# Patient Record
Sex: FEMALE | Race: WHITE | NOT HISPANIC OR LATINO
[De-identification: names, ages, dates, MRNs, and addresses within clinical notes are randomized per-mention and may not be internally consistent; named-entity substitution may affect disease eponyms.]

---

## 2017-01-19 ENCOUNTER — TRANSCRIPTION ENCOUNTER (OUTPATIENT)
Age: 37
End: 2017-01-19

## 2017-06-16 ENCOUNTER — TRANSCRIPTION ENCOUNTER (OUTPATIENT)
Age: 37
End: 2017-06-16

## 2018-02-05 ENCOUNTER — TRANSCRIPTION ENCOUNTER (OUTPATIENT)
Age: 38
End: 2018-02-05

## 2018-05-22 ENCOUNTER — EMERGENCY (EMERGENCY)
Facility: HOSPITAL | Age: 38
LOS: 0 days | Discharge: HOME | End: 2018-05-22
Attending: EMERGENCY MEDICINE | Admitting: EMERGENCY MEDICINE
Payer: COMMERCIAL

## 2018-05-22 VITALS
DIASTOLIC BLOOD PRESSURE: 60 MMHG | SYSTOLIC BLOOD PRESSURE: 106 MMHG | RESPIRATION RATE: 17 BRPM | HEART RATE: 78 BPM | OXYGEN SATURATION: 100 % | TEMPERATURE: 98 F

## 2018-05-22 VITALS — HEIGHT: 66 IN | WEIGHT: 162.04 LBS

## 2018-05-22 DIAGNOSIS — M54.32 SCIATICA, LEFT SIDE: ICD-10-CM

## 2018-05-22 DIAGNOSIS — F17.210 NICOTINE DEPENDENCE, CIGARETTES, UNCOMPLICATED: ICD-10-CM

## 2018-05-22 DIAGNOSIS — R20.0 ANESTHESIA OF SKIN: ICD-10-CM

## 2018-05-22 RX ORDER — HYDROMORPHONE HYDROCHLORIDE 2 MG/ML
1 INJECTION INTRAMUSCULAR; INTRAVENOUS; SUBCUTANEOUS ONCE
Refills: 0 | Status: DISCONTINUED | OUTPATIENT
Start: 2018-05-22 | End: 2018-05-22

## 2018-05-22 RX ORDER — MORPHINE SULFATE 50 MG/1
4 CAPSULE, EXTENDED RELEASE ORAL ONCE
Refills: 0 | Status: DISCONTINUED | OUTPATIENT
Start: 2018-05-22 | End: 2018-05-22

## 2018-05-22 RX ORDER — ONDANSETRON 8 MG/1
1 TABLET, FILM COATED ORAL
Qty: 12 | Refills: 0
Start: 2018-05-22

## 2018-05-22 RX ORDER — HYDROMORPHONE HYDROCHLORIDE 2 MG/ML
0.5 INJECTION INTRAMUSCULAR; INTRAVENOUS; SUBCUTANEOUS ONCE
Refills: 0 | Status: DISCONTINUED | OUTPATIENT
Start: 2018-05-22 | End: 2018-05-22

## 2018-05-22 RX ORDER — SODIUM CHLORIDE 9 MG/ML
1000 INJECTION INTRAMUSCULAR; INTRAVENOUS; SUBCUTANEOUS ONCE
Refills: 0 | Status: COMPLETED | OUTPATIENT
Start: 2018-05-22 | End: 2018-05-22

## 2018-05-22 RX ORDER — DEXAMETHASONE 0.5 MG/5ML
6 ELIXIR ORAL ONCE
Refills: 0 | Status: COMPLETED | OUTPATIENT
Start: 2018-05-22 | End: 2018-05-22

## 2018-05-22 RX ORDER — ONDANSETRON 8 MG/1
4 TABLET, FILM COATED ORAL ONCE
Refills: 0 | Status: COMPLETED | OUTPATIENT
Start: 2018-05-22 | End: 2018-05-22

## 2018-05-22 RX ORDER — OXYCODONE HYDROCHLORIDE 5 MG/1
1 TABLET ORAL
Qty: 20 | Refills: 0
Start: 2018-05-22

## 2018-05-22 RX ADMIN — MORPHINE SULFATE 4 MILLIGRAM(S): 50 CAPSULE, EXTENDED RELEASE ORAL at 20:56

## 2018-05-22 RX ADMIN — HYDROMORPHONE HYDROCHLORIDE 0.5 MILLIGRAM(S): 2 INJECTION INTRAMUSCULAR; INTRAVENOUS; SUBCUTANEOUS at 18:20

## 2018-05-22 RX ADMIN — SODIUM CHLORIDE 1000 MILLILITER(S): 9 INJECTION INTRAMUSCULAR; INTRAVENOUS; SUBCUTANEOUS at 15:34

## 2018-05-22 RX ADMIN — ONDANSETRON 4 MILLIGRAM(S): 8 TABLET, FILM COATED ORAL at 15:35

## 2018-05-22 RX ADMIN — HYDROMORPHONE HYDROCHLORIDE 0.5 MILLIGRAM(S): 2 INJECTION INTRAMUSCULAR; INTRAVENOUS; SUBCUTANEOUS at 17:50

## 2018-05-22 RX ADMIN — Medication 6 MILLIGRAM(S): at 15:35

## 2018-05-22 RX ADMIN — HYDROMORPHONE HYDROCHLORIDE 1 MILLIGRAM(S): 2 INJECTION INTRAMUSCULAR; INTRAVENOUS; SUBCUTANEOUS at 15:35

## 2018-05-22 RX ADMIN — HYDROMORPHONE HYDROCHLORIDE 1 MILLIGRAM(S): 2 INJECTION INTRAMUSCULAR; INTRAVENOUS; SUBCUTANEOUS at 17:00

## 2018-05-22 NOTE — ED STATDOCS - OBJECTIVE STATEMENT
37 y/o female with a PMHx of herniated lumbar discs presents to the ED c/o severe left sided back pain with radiation to left leg. +numbness and tingling of left leg, bottom of left foot and left buttock. +weakness. Took motrin PTA this AM. Denies urinary incontinence. Herniated L4 L5 surgery 3 years ago. Former smoker.

## 2018-05-22 NOTE — ED STATDOCS - PROGRESS NOTE DETAILS
signed Damaris Ochoa PA-C Pt seen initially in intake by Dr Farias.   Pt c/o atraumatic low back pain x 3 weeks, became worse last night. Pt c/o new numbness in LLE and buttocks. Hx L4 L5 disc sx several years ago with Dr Castillo. Pt saw PA in Dr Castillo's office yesterday, given rx for prednisone and gabapentin, though pt notes gabapentin rx was never received by pharmacy. Pt with weakness of ankle and great toe dorsi/plantarflexion 5-/5. Amble to ambulate unassisted though not on her left toes. mild numbness LLE. Pt refuses rectal exam. Plan analgesia and MRI. Pt agrees with plan of  care. Spine paged signed Damaris Ochoa PA-C   Spoke to spine Dr Collins. Agrees with plan for MRI signed Damaris Ochoa PA-C Pt seen initially in intake by Dr Farias.   Pt c/o atraumatic low back pain x 3 weeks, became worse last night. Pt c/o new numbness in LLE and buttocks. Hx L4 L5 disc sx several years ago with Dr Castillo. Pt saw PA in Dr Castillo's office yesterday, given rx for prednisone and gabapentin, though pt notes gabapentin rx was never received by pharmacy. Pt with weakness of ankle and great toe dorsi/plantarflexion 5-/5. Amble to ambulate unassisted though not on her left toes. mild numbness LLE. Pt refuses rectal exam. Plan analgesia and MRI. Pt agrees with plan of  care. Spoke to MRI tech, MRI won't be until at least 2 hours or more. Spine paged. signed Damaris Ochoa PA-C   Spoke to spine Dr Collins. Agrees with plan for MRI. If any significant findings, page on call spine again. If no significant findings may DC for close outpt f/u. signed Damaris Ochoa PA-C   MRI shows significant left sided disc extrusion L4 L5. Pt still having pain. Spine paged. signed Damaris Ochoa PA-C   Spoke to Dr Bacon on call for spine regarding pts exam and MRI results. Pt may DC home, close outpt f/u this week, likely needs epidural vs surgery. rx for analgesia. Pt agrees with DC and plan of care, still in pain but able to ambulate. rx oxycodone and zofran. Will give pt copy of MRI. return precautions discussed in detail with pt and .

## 2018-05-22 NOTE — ED STATDOCS - MEDICAL DECISION MAKING DETAILS
37 y/o female presents with left sided back pain, plan for MRI r/o cord compression. Patient refused rectal exam.

## 2020-03-20 PROBLEM — Z00.00 ENCOUNTER FOR PREVENTIVE HEALTH EXAMINATION: Noted: 2020-03-20

## 2020-05-06 ENCOUNTER — TRANSCRIPTION ENCOUNTER (OUTPATIENT)
Age: 40
End: 2020-05-06

## 2020-05-27 ENCOUNTER — APPOINTMENT (OUTPATIENT)
Dept: OBGYN | Facility: CLINIC | Age: 40
End: 2020-05-27
Payer: COMMERCIAL

## 2020-05-27 ENCOUNTER — TRANSCRIPTION ENCOUNTER (OUTPATIENT)
Age: 40
End: 2020-05-27

## 2020-05-27 VITALS
HEIGHT: 65 IN | WEIGHT: 177 LBS | BODY MASS INDEX: 29.49 KG/M2 | SYSTOLIC BLOOD PRESSURE: 110 MMHG | DIASTOLIC BLOOD PRESSURE: 70 MMHG | TEMPERATURE: 98.3 F

## 2020-05-27 DIAGNOSIS — Z01.419 ENCOUNTER FOR GYNECOLOGICAL EXAMINATION (GENERAL) (ROUTINE) W/OUT ABNORMAL FINDINGS: ICD-10-CM

## 2020-05-27 NOTE — CHIEF COMPLAINT
[Initial Visit] : initial GYN visit [FreeTextEntry1] : Patient is a 40-year-old female presents for routine annual gynecologic examination. Patient has no complaints. Patient has never had a mammogram.

## 2020-06-03 LAB
C TRACH RRNA SPEC QL NAA+PROBE: NOT DETECTED
HPV HIGH+LOW RISK DNA PNL CVX: NOT DETECTED
N GONORRHOEA RRNA SPEC QL NAA+PROBE: NOT DETECTED
SOURCE TP AMPLIFICATION: NORMAL

## 2020-08-26 ENCOUNTER — APPOINTMENT (OUTPATIENT)
Dept: MAMMOGRAPHY | Facility: CLINIC | Age: 40
End: 2020-08-26

## 2021-03-09 ENCOUNTER — EMERGENCY (EMERGENCY)
Facility: HOSPITAL | Age: 41
LOS: 0 days | Discharge: HOME | End: 2021-03-09
Attending: EMERGENCY MEDICINE
Payer: COMMERCIAL

## 2021-03-09 VITALS
SYSTOLIC BLOOD PRESSURE: 130 MMHG | OXYGEN SATURATION: 98 % | RESPIRATION RATE: 18 BRPM | DIASTOLIC BLOOD PRESSURE: 75 MMHG | TEMPERATURE: 99 F | HEART RATE: 70 BPM

## 2021-03-09 VITALS — WEIGHT: 177.91 LBS | HEIGHT: 65 IN

## 2021-03-09 DIAGNOSIS — K08.89 OTHER SPECIFIED DISORDERS OF TEETH AND SUPPORTING STRUCTURES: ICD-10-CM

## 2021-03-09 DIAGNOSIS — R50.9 FEVER, UNSPECIFIED: ICD-10-CM

## 2021-03-09 DIAGNOSIS — K04.7 PERIAPICAL ABSCESS WITHOUT SINUS: ICD-10-CM

## 2021-03-09 DIAGNOSIS — R51.9 HEADACHE, UNSPECIFIED: ICD-10-CM

## 2021-03-09 DIAGNOSIS — Z88.0 ALLERGY STATUS TO PENICILLIN: ICD-10-CM

## 2021-03-09 RX ORDER — HYDROMORPHONE HYDROCHLORIDE 2 MG/ML
1 INJECTION INTRAMUSCULAR; INTRAVENOUS; SUBCUTANEOUS ONCE
Refills: 0 | Status: DISCONTINUED | OUTPATIENT
Start: 2021-03-09 | End: 2021-03-09

## 2021-03-09 RX ADMIN — Medication 100 MILLIGRAM(S): at 13:28

## 2021-03-09 RX ADMIN — HYDROMORPHONE HYDROCHLORIDE 1 MILLIGRAM(S): 2 INJECTION INTRAMUSCULAR; INTRAVENOUS; SUBCUTANEOUS at 13:28

## 2021-03-09 NOTE — ED STATDOCS - PROGRESS NOTE DETAILS
signed Damaris Ochoa PA-C Pt seen initially in intake by Dr. Rolle   41F c/o worsening dental pain. She saw her dentist yesterday and was told she has a dental infection and needs a root canal but the infection needs to be treated first. Started on motrin and a z pack. Pt alert, appears uncomfortable, no facial swelling or apparent abscess. Plan 1 dose IV clinda and change z pack to PO clinda, rx for percocet. Pt agrees with plan of  care. signed Damaris Ochoa PA-C   Pt feeling much better, getting ride home. f/u with dentist. DC z pack. Pt agrees with DC and plan of care.

## 2021-03-09 NOTE — ED ADULT NURSE NOTE - CHIEF COMPLAINT QUOTE
c/o left sided toothache for past week, went to dentist yesterday and was told she had abscess, started taking zpack with motrin, with no pain relief, c/o fever last night, c/o pain radiating to left sided sinus pain and ear pain

## 2021-03-09 NOTE — ED STATDOCS - ATTENDING CONTRIBUTION TO CARE
I, Rasta Rolle MD,  performed the initial face to face bedside interview with this patient regarding history of present illness, review of symptoms and relevant past medical, social and family history.  I completed an independent physical examination.  I was the initial provider who evaluated this patient. I have signed out the follow up of any pending tests (i.e. labs, radiological studies) to the ACP.  I have communicated the patient’s plan of care and disposition with the ACP.  The history, relevant review of systems, past medical and surgical history, medical decision making, and physical examination was documented by the scribe in my presence and I attest to the accuracy of the documentation.

## 2021-03-09 NOTE — ED ADULT TRIAGE NOTE - CHIEF COMPLAINT QUOTE
Dear Sofi,   Your test results are all back -   -FIT test (screening test for colon cancer) was normal. ADVISE: rechecking this test in 1 year.  Let us know if you have any questions.  -Noemy Olivera, DO   c/o left sided toothache for past week, went to dentist yesterday and was told she had abscess, started taking zpack with motrin, with no pain relief, c/o fever last night, c/o pain radiating to left sided sinus pain and ear pain

## 2021-03-09 NOTE — ED STATDOCS - ENMT, MLM
Nasal mucosa clear. gingival disease lower gum underneath L lower incisor deeper wound, no active drainage, mild redness, braces behind lower incisor Throat has no vesicles, no oropharyngeal exudates and uvula is midline. Nasal mucosa clear. gingival disease lower gum,  underneath L lower incisor deeper wound, no active drainage, mild redness, braces behind lower incisor Throat has no vesicles, no oropharyngeal exudates and uvula is midline.

## 2021-03-09 NOTE — ED STATDOCS - CLINICAL SUMMARY MEDICAL DECISION MAKING FREE TEXT BOX
pt with dental infection, placed on Motrin Azithromycin but not helping, plan; erythromycin and pain control

## 2021-03-09 NOTE — ED ADULT NURSE NOTE - OBJECTIVE STATEMENT
pt. c/o tooth infection diagnosed by dentist yesterday and placed on z-pack, pt. states she has been taking motrin for pain without relief, pain has now spread to temples and has 10/10 headache.

## 2021-03-09 NOTE — ED STATDOCS - DISPOSITION TYPE
Spoke with Teresa from [facility] she was requesting signature and documentation on patient wound to support the need of her having the wound vac, I notified her that pt is being followed by infectious disease at [facility] but [facility] does go to patient home. I gave Teresa the information to reach out to the specialist  that is following patient and advised that I would still have Dr. Yaniv Mccain sign the forms for pt wound vac but they should contact pt specialist for supporting documentation for the wound vac.    DISCHARGE

## 2021-03-09 NOTE — ED STATDOCS - PATIENT PORTAL LINK FT
You can access the FollowMyHealth Patient Portal offered by [facility] by registering at the following website: [email or web address]. By joining [employer]’s FollowMyHealth portal, you will also be able to view your health information using other applications (apps) compatible with our system.

## 2021-03-10 ENCOUNTER — EMERGENCY (EMERGENCY)
Facility: HOSPITAL | Age: 41
LOS: 0 days | Discharge: HOME | End: 2021-03-10
Attending: EMERGENCY MEDICINE
Payer: COMMERCIAL

## 2021-03-10 VITALS
HEART RATE: 70 BPM | SYSTOLIC BLOOD PRESSURE: 125 MMHG | TEMPERATURE: 99 F | DIASTOLIC BLOOD PRESSURE: 64 MMHG | OXYGEN SATURATION: 99 % | RESPIRATION RATE: 17 BRPM

## 2021-03-10 VITALS — HEIGHT: 65 IN | WEIGHT: 177.03 LBS

## 2021-03-10 DIAGNOSIS — Z88.0 ALLERGY STATUS TO PENICILLIN: ICD-10-CM

## 2021-03-10 DIAGNOSIS — Z79.891 LONG TERM (CURRENT) USE OF OPIATE ANALGESIC: ICD-10-CM

## 2021-03-10 DIAGNOSIS — J45.909 UNSPECIFIED ASTHMA, UNCOMPLICATED: ICD-10-CM

## 2021-03-10 DIAGNOSIS — K02.9 DENTAL CARIES, UNSPECIFIED: ICD-10-CM

## 2021-03-10 DIAGNOSIS — K08.89 OTHER SPECIFIED DISORDERS OF TEETH AND SUPPORTING STRUCTURES: ICD-10-CM

## 2021-03-10 LAB
ALBUMIN SERPL ELPH-MCNC: 3.5 G/DL — SIGNIFICANT CHANGE UP (ref 3.3–5)
ALP SERPL-CCNC: 41 U/L — SIGNIFICANT CHANGE UP (ref 40–120)
ALT FLD-CCNC: 17 U/L — SIGNIFICANT CHANGE UP (ref 12–78)
ANION GAP SERPL CALC-SCNC: 4 MMOL/L — LOW (ref 5–17)
AST SERPL-CCNC: 7 U/L — LOW (ref 15–37)
BASOPHILS # BLD AUTO: 0.05 K/UL — SIGNIFICANT CHANGE UP (ref 0–0.2)
BASOPHILS NFR BLD AUTO: 0.4 % — SIGNIFICANT CHANGE UP (ref 0–2)
BILIRUB SERPL-MCNC: 1 MG/DL — SIGNIFICANT CHANGE UP (ref 0.2–1.2)
BUN SERPL-MCNC: 9 MG/DL — SIGNIFICANT CHANGE UP (ref 7–23)
CALCIUM SERPL-MCNC: 8.6 MG/DL — SIGNIFICANT CHANGE UP (ref 8.5–10.1)
CHLORIDE SERPL-SCNC: 106 MMOL/L — SIGNIFICANT CHANGE UP (ref 96–108)
CO2 SERPL-SCNC: 27 MMOL/L — SIGNIFICANT CHANGE UP (ref 22–31)
CREAT SERPL-MCNC: 0.6 MG/DL — SIGNIFICANT CHANGE UP (ref 0.5–1.3)
EOSINOPHIL # BLD AUTO: 0.15 K/UL — SIGNIFICANT CHANGE UP (ref 0–0.5)
EOSINOPHIL NFR BLD AUTO: 1.2 % — SIGNIFICANT CHANGE UP (ref 0–6)
GLUCOSE SERPL-MCNC: 91 MG/DL — SIGNIFICANT CHANGE UP (ref 70–99)
HCG SERPL-ACNC: <1 MIU/ML — SIGNIFICANT CHANGE UP
HCT VFR BLD CALC: 38.9 % — SIGNIFICANT CHANGE UP (ref 34.5–45)
HGB BLD-MCNC: 13 G/DL — SIGNIFICANT CHANGE UP (ref 11.5–15.5)
IMM GRANULOCYTES NFR BLD AUTO: 0.4 % — SIGNIFICANT CHANGE UP (ref 0–1.5)
LYMPHOCYTES # BLD AUTO: 1.59 K/UL — SIGNIFICANT CHANGE UP (ref 1–3.3)
LYMPHOCYTES # BLD AUTO: 12.4 % — LOW (ref 13–44)
MCHC RBC-ENTMCNC: 31.9 PG — SIGNIFICANT CHANGE UP (ref 27–34)
MCHC RBC-ENTMCNC: 33.4 GM/DL — SIGNIFICANT CHANGE UP (ref 32–36)
MCV RBC AUTO: 95.3 FL — SIGNIFICANT CHANGE UP (ref 80–100)
MONOCYTES # BLD AUTO: 1.38 K/UL — HIGH (ref 0–0.9)
MONOCYTES NFR BLD AUTO: 10.8 % — SIGNIFICANT CHANGE UP (ref 2–14)
NEUTROPHILS # BLD AUTO: 9.61 K/UL — HIGH (ref 1.8–7.4)
NEUTROPHILS NFR BLD AUTO: 74.8 % — SIGNIFICANT CHANGE UP (ref 43–77)
PLATELET # BLD AUTO: 273 K/UL — SIGNIFICANT CHANGE UP (ref 150–400)
POTASSIUM SERPL-MCNC: 4.2 MMOL/L — SIGNIFICANT CHANGE UP (ref 3.5–5.3)
POTASSIUM SERPL-SCNC: 4.2 MMOL/L — SIGNIFICANT CHANGE UP (ref 3.5–5.3)
PROT SERPL-MCNC: 7 GM/DL — SIGNIFICANT CHANGE UP (ref 6–8.3)
RBC # BLD: 4.08 M/UL — SIGNIFICANT CHANGE UP (ref 3.8–5.2)
RBC # FLD: 12.6 % — SIGNIFICANT CHANGE UP (ref 10.3–14.5)
SODIUM SERPL-SCNC: 137 MMOL/L — SIGNIFICANT CHANGE UP (ref 135–145)
WBC # BLD: 12.83 K/UL — HIGH (ref 3.8–10.5)
WBC # FLD AUTO: 12.83 K/UL — HIGH (ref 3.8–10.5)

## 2021-03-10 RX ORDER — KETOROLAC TROMETHAMINE 30 MG/ML
30 SYRINGE (ML) INJECTION ONCE
Refills: 0 | Status: DISCONTINUED | OUTPATIENT
Start: 2021-03-10 | End: 2021-03-10

## 2021-03-10 RX ORDER — CEFTRIAXONE 500 MG/1
1000 INJECTION, POWDER, FOR SOLUTION INTRAMUSCULAR; INTRAVENOUS ONCE
Refills: 0 | Status: COMPLETED | OUTPATIENT
Start: 2021-03-10 | End: 2021-03-10

## 2021-03-10 RX ORDER — CEFTRIAXONE 500 MG/1
1000 INJECTION, POWDER, FOR SOLUTION INTRAMUSCULAR; INTRAVENOUS ONCE
Refills: 0 | Status: DISCONTINUED | OUTPATIENT
Start: 2021-03-10 | End: 2021-03-10

## 2021-03-10 RX ORDER — SODIUM CHLORIDE 9 MG/ML
1000 INJECTION INTRAMUSCULAR; INTRAVENOUS; SUBCUTANEOUS ONCE
Refills: 0 | Status: COMPLETED | OUTPATIENT
Start: 2021-03-10 | End: 2021-03-10

## 2021-03-10 RX ADMIN — SODIUM CHLORIDE 1000 MILLILITER(S): 9 INJECTION INTRAMUSCULAR; INTRAVENOUS; SUBCUTANEOUS at 06:25

## 2021-03-10 RX ADMIN — Medication 30 MILLIGRAM(S): at 06:29

## 2021-03-10 RX ADMIN — CEFTRIAXONE 1000 MILLIGRAM(S): 500 INJECTION, POWDER, FOR SOLUTION INTRAMUSCULAR; INTRAVENOUS at 08:58

## 2021-03-10 NOTE — ED ADULT TRIAGE NOTE - CHIEF COMPLAINT QUOTE
patient complains of worsening swelling and pain from tooth infection. was seen in ED on 3/9 for same, discharged home on clindamycin. took two doses of clindamycin.

## 2021-03-10 NOTE — ED ADULT NURSE NOTE - OBJECTIVE STATEMENT
pt. presents to ED complaining of toothache. pt. was seen in  ED 3/9 and was sent home on PO abx. pt. is Since then swelling and pain got worse, pt. seen dentist and was given Zpack for infection. pt. has fever of 100.4 F at home.

## 2021-03-10 NOTE — ED PROVIDER NOTE - OBJECTIVE STATEMENT
40 y/o female in ED c/o left toothache x 2 days with swelling this AM.   pt states seen in ED yesterday for toothache and started on abx.   states was told by her dentist that infection needs to be treated first before any dental procedure.   pt states also with fever of 100.5 this morning.   pt denies any HA, cp, sob, n/v/d/abd pain.   tolerating PO.   no sick contacts or recent travel.

## 2021-03-10 NOTE — ED PROVIDER NOTE - PATIENT PORTAL LINK FT
You can access the FollowMyHealth Patient Portal offered by [facility] by registering at the following website: [email or web address]. By joining [employer]’s FollowMyHealth portal, you will also be able to view your health information using other applications (apps) compatible with our system.

## 2021-03-10 NOTE — ED PROVIDER NOTE - NSFOLLOWUPINSTRUCTIONS_ED_ALL_ED_FT
Please see Dr. Morales today at the oral maxillofacial surgery clinic for drainage of your abscess.  The phone number is [phone].  They will be expecting your call for an appointment today.      Dental Abscess    WHAT YOU NEED TO KNOW:    A dental abscess is a collection of pus in or around a tooth. A dental abscess is caused by bacteria. The bacteria can enter the tooth when the enamel (outer part of the tooth) is damaged by tooth decay. Bacteria can also enter the tooth through a chip in the tooth or a cut in the gum. Food particles that are stuck between the teeth for a long time may also lead to an abscess.     Dental Abscess         DISCHARGE INSTRUCTIONS:    Return to the emergency department if:   •You have severe pain in your tooth or jaw.      •You have trouble breathing because of pain or swelling.      Call your doctor if:   •Your symptoms get worse, even after treatment.      •Your mouth is bleeding.      •You cannot eat or drink because of pain or swelling.      •Your abscess returns.      •You have an injury that causes a crack in your tooth.      •You have questions or concerns about your condition or care.      Medicines: You may need any of the following:   •Antibiotics help treat a bacterial infection.       •NSAIDs, such as ibuprofen, help decrease swelling, pain, and fever. This medicine is available with or without a doctor's order. NSAIDs can cause stomach bleeding or kidney problems in certain people. If you take blood thinner medicine, always ask your healthcare provider if NSAIDs are safe for you. Always read the medicine label and follow directions.      •Acetaminophen decreases pain and fever. It is available without a doctor's order. Ask how much to take and how often to take it. Follow directions. Read the labels of all other medicines you are using to see if they also contain acetaminophen, or ask your doctor or pharmacist. Acetaminophen can cause liver damage if not taken correctly. Do not use more than 4 grams (4,000 milligrams) total of acetaminophen in one day.       •Prescription pain medicine may be given. Ask your healthcare provider how to take this medicine safely. Some prescription pain medicines contain acetaminophen. Do not take other medicines that contain acetaminophen without talking to your healthcare provider. Too much acetaminophen may cause liver damage. Prescription pain medicine may cause constipation. Ask your healthcare provider how to prevent or treat constipation.       •Take your medicine as directed. Contact your healthcare provider if you think your medicine is not helping or if you have side effects. Tell him of her if you are allergic to any medicine. Keep a list of the medicines, vitamins, and herbs you take. Include the amounts, and when and why you take them. Bring the list or the pill bottles to follow-up visits. Carry your medicine list with you in case of an emergency.      Self-care:   •Rinse your mouth every 2 hours with salt water. This will help keep the area clean.       •Gently brush your teeth twice a day with a soft tooth brush. This will help keep the area clean.       •Eat soft foods as directed. Soft foods may cause less pain. Examples include applesauce, yogurt, and cooked pasta. Ask your healthcare provider how long to follow this instruction.       •Apply a warm compress to your tooth or gum. Use a cotton ball or gauze soaked in warm water. Remove the compress in 10 minutes or when it becomes cool. Repeat 3 times a day.       Prevent another abscess:   •Brush your teeth at least 2 times a day with fluoride toothpaste.      •Use dental floss at least once a day to clean between your teeth.      •Rinse your mouth with water or mouthwash after meals and snacks. Chew sugarless gum.      •Avoid sugary and starchy food that can stick between your teeth. Limit drinks high in sugar, such as soda or fruit juice.      •See your dentist every 6 months for dental cleanings and oral exams.      Follow up with your doctor or dentist in 24 hours, or as directed: Your healthcare provider will need to check your teeth and gums. Write down your questions so you remember to ask them during your visits.         Cellulitis    WHAT YOU NEED TO KNOW:    Cellulitis is a skin infection caused by bacteria. Cellulitis may go away on its own or you may need treatment. Your healthcare provider may draw a [place] around the outside edges of your cellulitis. If your cellulitis spreads, your healthcare provider will see it outside of the [place]. Cellulitis          DISCHARGE INSTRUCTIONS:    Call 911 if:     You have sudden trouble breathing or chest pain.        Return to the emergency department if:     Your wound gets larger and more painful.       You feel a crackling under your skin when you touch it.      You have purple dots or bumps on your skin, or you see bleeding under your skin.      You have new swelling and pain in your legs.      The red, warm, swollen area gets larger.      You see red streaks coming from the infected area.    Contact your healthcare provider if:     You have a fever.      Your fever or pain does not go away or gets worse.      The area does not get smaller after 2 days of antibiotics.      Your skin is flaking or peeling off.      You have questions or concerns about your condition or care.    Medicines:     Antibiotics help treat the bacterial infection.       NSAIDs, such as ibuprofen, help decrease swelling, pain, and fever. NSAIDs can cause stomach bleeding or kidney problems in certain people. If you take blood thinner medicine, always ask if NSAIDs are safe for you. Always read the medicine label and follow directions. Do not give these medicines to children under 6 months of age without direction from your child's healthcare provider.      Acetaminophen decreases pain and fever. It is available without a doctor's order. Ask how much to take and how often to take it. Follow directions. Read the labels of all other medicines you are using to see if they also contain acetaminophen, or ask your doctor or pharmacist. Acetaminophen can cause liver damage if not taken correctly. Do not use more than 4 grams (4,000 milligrams) total of acetaminophen in one day.       Take your medicine as directed. Contact your healthcare provider if you think your medicine is not helping or if you have side effects. Tell him or her if you are allergic to any medicine. Keep a list of the medicines, vitamins, and herbs you take. Include the amounts, and when and why you take them. Bring the list or the pill bottles to follow-up visits. Carry your medicine list with you in case of an emergency.    Self-care:     Elevate the area above the level of your heart as often as you can. This will help decrease swelling and pain. Prop the area on pillows or blankets to keep it elevated comfortably.       Clean the area daily until the wound scabs over. Gently wash the area with soap and water. Pat dry. Use dressings as directed.       Place cool or warm, wet cloths on the area as directed. Use clean cloths and clean water. Leave it on the area until the cloth is room temperature. Pat the area dry with a clean, dry cloth. The cloths may help decrease pain.     Prevent cellulitis:     Do not scratch bug bites or areas of injury. You increase your risk for cellulitis by scratching these areas.       Do not share personal items, such as towels, clothing, and razors.       Clean exercise equipment with germ-killing  before and after you use it.      Wash your hands often. Use soap and water. Wash your hands after you use the bathroom, change a child's diapers, or sneeze. Wash your hands before you prepare or eat food. Use lotion to prevent dry, cracked skin. Handwashing           Wear pressure stockings as directed. You may be told to wear the stockings if you have peripheral edema. The stockings improve blood flow and decrease swelling.      Treat athlete’s foot. This can help prevent the spread of a bacterial skin infection.    Follow up with your healthcare provider within 3 days, or as directed: Your healthcare provider will check if your cellulitis is getting better. You may need different medicine. Write down your questions so you remember to ask them during your visits.

## 2021-03-10 NOTE — ED PROVIDER NOTE - CARE PROVIDER_API CALL
Luiz Morales (DMD; MD)  OralMaxillofacial Surgery  [address] [place] [address]  Phone: [phone]  Fax: [phone]  Follow Up Time: Urgent

## 2021-03-10 NOTE — ED ADULT NURSE NOTE - NSIMPLEMENTINTERV_GEN_ALL_ED
Implemented All Universal Safety Interventions:  [place] to call system. Call bell, personal items and telephone within reach. Instruct patient to call for assistance. Room bathroom lighting operational. Non-slip footwear when patient is off stretcher. Physically safe environment: no spills, clutter or unnecessary equipment. Stretcher in lowest position, wheels locked, appropriate side rails in place.

## 2021-03-10 NOTE — ED PROVIDER NOTE - PROGRESS NOTE DETAILS
Pt s/o to me.  Care assumed from Dr. Guerrero.  Pending CT max/face.  CT reveals cellulitis with subcentimeter abscess.  Discussed with Dr. Morales of [facility] who recommends f/u in clinic today for drainage.  Will squeeze patient  into schedule.  Pt well appearing on my assessment.  Dosed with ceftriaxone  here.  To go for intervention at this time.  AVSS and otherwise well appearing.  Would like to leave and f/u with [facility].  Understands indications to return.  D/c home with strict return precautions and prompt outpatient f/u.  Toribio Lara M.D.

## 2022-01-23 NOTE — ED ADULT TRIAGE NOTE - BMI (KG/M2)
ALERT AND ORIENTED, SPOUSE AT BEDSIDE BEGINNING OF SHIFT, SCHEDULED PAIN MEDS
GIVEN FOR GENERALIZED PAIN, ANXIOUS AT TIMES ESPECIALLY WITH REPOSITIONING OR
TOUCH, ASSESSMENTS AS CHARTED, WILL CONTINUE TO MONITOR PER POC 26.2

## 2022-11-23 NOTE — ED ADULT TRIAGE NOTE - HEIGHT IN INCHES
5 Libtayo Counseling- I discussed with the patient the risks of Libtayo including but not limited to nausea, vomiting, diarrhea, and bone or muscle pain.  The patient verbalized understanding of the proper use and possible adverse effects of Libtayo.  All of the patient's questions and concerns were addressed.

## 2023-01-17 PROBLEM — M51.26 OTHER INTERVERTEBRAL DISC DISPLACEMENT, LUMBAR REGION: Chronic | Status: ACTIVE | Noted: 2018-05-22

## 2023-02-06 NOTE — ED PROVIDER NOTE - RESPIRATORY, MLM
Patient is schedule for stress test on 2/14/23.Please advise if patient should hold off her metropolol medication . Thanks.   Breath sounds clear and equal bilaterally.

## 2023-03-14 ENCOUNTER — APPOINTMENT (OUTPATIENT)
Dept: OBGYN | Facility: CLINIC | Age: 43
End: 2023-03-14
Payer: COMMERCIAL

## 2023-03-14 VITALS
BODY MASS INDEX: 28.32 KG/M2 | HEART RATE: 75 BPM | DIASTOLIC BLOOD PRESSURE: 79 MMHG | WEIGHT: 170 LBS | SYSTOLIC BLOOD PRESSURE: 120 MMHG | HEIGHT: 65 IN

## 2023-03-14 DIAGNOSIS — Z00.00 ENCOUNTER FOR GENERAL ADULT MEDICAL EXAMINATION W/OUT ABNORMAL FINDINGS: ICD-10-CM

## 2023-03-14 LAB
BILIRUB UR QL STRIP: NORMAL
CLARITY UR: CLEAR
COLLECTION METHOD: NORMAL
GLUCOSE UR-MCNC: NORMAL
HCG UR QL: 0.2 EU/DL
HEMOGLOBIN: 13.5
HGB UR QL STRIP.AUTO: NORMAL
KETONES UR-MCNC: NORMAL
LEUKOCYTE ESTERASE UR QL STRIP: NORMAL
NITRITE UR QL STRIP: NORMAL
PH UR STRIP: 7
PROT UR STRIP-MCNC: NORMAL
SP GR UR STRIP: 1.01

## 2023-03-14 NOTE — DISCUSSION/SUMMARY
[FreeTextEntry1] : Pt to track periods over the next 3 months. \par Patient to follow up in 1 year for annual GYN exam\par Mammogram  due: now; Rx given\par Colonoscopy due: 45\par Bone density due: 55 or PM\par Pap ordered\par Hemoccult ordered\par All questions answered, patient is agreeable with plan.\par \par

## 2023-03-14 NOTE — PHYSICAL EXAM
[Chaperone Present] : A chaperone was present in the examining room during all aspects of the physical examination [Appropriately responsive] : appropriately responsive [Alert] : alert [No Acute Distress] : no acute distress [No Lymphadenopathy] : no lymphadenopathy [Soft] : soft [Non-tender] : non-tender [Non-distended] : non-distended [No HSM] : No HSM [No Lesions] : no lesions [No Mass] : no mass [Oriented x3] : oriented x3 [Examination Of The Breasts] : a normal appearance [No Masses] : no breast masses were palpable [Labia Minora] : normal [Labia Majora] : normal [Normal] : normal [Uterine Adnexae] : normal [FreeTextEntry1] : SOPHIA GustafsonC

## 2023-03-14 NOTE — PHYSICAL EXAM
[Chaperone Present] : A chaperone was present in the examining room during all aspects of the physical examination [Appropriately responsive] : appropriately responsive [Alert] : alert [No Acute Distress] : no acute distress [No Lymphadenopathy] : no lymphadenopathy [Soft] : soft [Non-tender] : non-tender [Non-distended] : non-distended [No HSM] : No HSM [No Lesions] : no lesions [No Mass] : no mass [Oriented x3] : oriented x3 [Examination Of The Breasts] : a normal appearance [No Masses] : no breast masses were palpable [Labia Majora] : normal [Labia Minora] : normal [Normal] : normal [Uterine Adnexae] : normal [FreeTextEntry1] : SOPHIA GustafsonC

## 2023-03-14 NOTE — HISTORY OF PRESENT ILLNESS
[FreeTextEntry1] : 43 year old female presents as a new patient to establish care. condoms for BC. c/o changes in her period. pt states she was previously very regular and has had irregular periods for the past 2 months. \par \par hx- 2 c-sections, family hx of breast ca (aunt at age 40 & paternal grandmother), BRCA negative, diskectomy, neck fusion

## 2023-03-15 ENCOUNTER — APPOINTMENT (OUTPATIENT)
Dept: MAMMOGRAPHY | Facility: CLINIC | Age: 43
End: 2023-03-15
Payer: COMMERCIAL

## 2023-03-15 ENCOUNTER — RESULT REVIEW (OUTPATIENT)
Age: 43
End: 2023-03-15

## 2023-03-15 ENCOUNTER — OUTPATIENT (OUTPATIENT)
Dept: OUTPATIENT SERVICES | Facility: HOSPITAL | Age: 43
LOS: 1 days | End: 2023-03-15
Payer: COMMERCIAL

## 2023-03-15 DIAGNOSIS — Z00.00 ENCOUNTER FOR GENERAL ADULT MEDICAL EXAMINATION WITHOUT ABNORMAL FINDINGS: ICD-10-CM

## 2023-03-16 ENCOUNTER — NON-APPOINTMENT (OUTPATIENT)
Age: 43
End: 2023-03-16

## 2023-03-16 DIAGNOSIS — R92.8 OTHER ABNORMAL AND INCONCLUSIVE FINDINGS ON DIAGNOSTIC IMAGING OF BREAST: ICD-10-CM

## 2023-03-20 LAB — CYTOLOGY CVX/VAG DOC THIN PREP: NORMAL

## 2023-04-03 ENCOUNTER — NON-APPOINTMENT (OUTPATIENT)
Age: 43
End: 2023-04-03

## 2024-08-05 NOTE — DISCUSSION/SUMMARY
HISTORY OF PRESENT ILLNESS:   The patient is a 13 year old female who is seen today along with mother with complaint of about 1 month history of pressure and fullness sensation in her right ear.  Denies any drainage or significant pain.  Denies upper respiratory symptoms.  They did try cleaning the ears out that this did not seem to make any difference.  No other new concerns or complaints    MEDICATIONS:  Current Outpatient Medications   Medication Sig Dispense Refill   • fluticasone (FLONASE) 50 MCG/ACT nasal spray Spray 2 sprays in each nostril daily. 16 g 1   • dexmethylpheniDATE (Focalin XR) 30 MG 24 hr capsule Take 1 capsule by mouth daily. 30 capsule 0   • amitriptyline (ELAVIL) 10 MG tablet Take 3 tablets by mouth nightly. 90 tablet 5     No current facility-administered medications for this visit.       ALLERGIES:  ALLERGIES:  No Known Allergies    PAST MEDICAL HISTORY:  Past Medical History:   Diagnosis Date   • ADHD (attention deficit hyperactivity disorder)        REVIEW OF SYSTEMS:  As noted above    PHYSICAL EXAM:  Vital Signs: Blood pressure 116/60, pulse 99, temperature 97.4 °F (36.3 °C), temperature source Temporal, resp. rate 20, height 5' 3.75\" (1.619 m), weight (!) 72.6 kg (159 lb 15.1 oz), SpO2 99%.  Pleasant cooperative child.  Alert in no acute distress.  Sclera showed no injection or crusting.  No localized sinus tenderness or active nasal drainage.  Ear canals free of cerumen or discharge.  TMs without fluid or erythema.  Oropharynx with moist weakness membranes.  Mild tonsillar enlargement but no erythema or exudate.  Neck is supple without lymphadenopathy or tenderness    ASSESSMENT AND PLAN:  1. Eustachian tube dysfunction, right  Discussed likely etiology with patient and her mother.  Prescription given for fluticasone nasal spray and they are instructed on proper use patch benefits and side effects as well as time course for expected results.  Update as needed if symptoms persist or  worsen or any new concerns or problems develop.  - fluticasone (FLONASE) 50 MCG/ACT nasal spray; Spray 2 sprays in each nostril daily.  Dispense: 16 g; Refill: 1    [FreeTextEntry1] : Pt to track periods over the next 3 months. \par Patient to follow up in 1 year for annual GYN exam\par Mammogram  due: now; Rx given\par Colonoscopy due: 45\par Bone density due: 55 or PM\par Pap ordered\par Hemoccult ordered\par All questions answered, patient is agreeable with plan.\par \par